# Patient Record
Sex: MALE | Race: WHITE | NOT HISPANIC OR LATINO | Employment: STUDENT | URBAN - METROPOLITAN AREA
[De-identification: names, ages, dates, MRNs, and addresses within clinical notes are randomized per-mention and may not be internally consistent; named-entity substitution may affect disease eponyms.]

---
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
## 2023-02-14 NOTE — PROGRESS NOTES
INTERNAL MEDICINE CLINIC - SAME DAY APPOINTMENT  Progress Note    PRESENTING HISTORY     PCP: Kizzy Whitehead MD    Chief Complaint/Reason for Visit:   No chief complaint on file.    History of Present Illness & ROS : Mr. Yosvany Craig is a 17 y.o. male.    Same day appt.   Very pleasant young man. Last seen by me in 9/2022, and has since est'd care with Dr. ANTWON Whitehead. He is scheduled to establish with Rheum, Dr MARTIN Perdue in 4/2023 2/2 + DOROTHEA. Presents today with a reported intermittent episodes of 'burning sensation to eyes, mild itching, burning to right ear and some pain if pressed, behind the right ear, all seems to happen when body is just very fatigued'. No cold symptoms endorsed, no sorethroat, cough, fever, chills, nasal congestion, body aches, headaches or GI symptoms.     Review of Systems:  Eyes: denies visual changes at this time denies floaters   Respiratory: no cough or shorness of breath  Cardiovascular: no chest pain or palpitations  Gastrointestinal: no nausea or vomiting, no abdominal pain or change in bowel habits  Genitourinary: no hematuria or dysuria; denies frequency  Hematologic/Lymphatic: no easy bruising or lymphadenopathy  Musculoskeletal: no arthralgias or myalgias  Neurological: no seizures or tremors  Endocrine: no heat or cold intolerance      PAST HISTORY:     No past medical history on file.    No past surgical history on file.    Family History   Problem Relation Age of Onset    Heart disease Father        Social History     Socioeconomic History    Marital status: Single   Tobacco Use    Smoking status: Never    Smokeless tobacco: Never       MEDICATIONS & ALLERGIES:     Current Outpatient Medications on File Prior to Visit   Medication Sig Dispense Refill    adapalene-benzoyl peroxide (EPIDUO FORTE) 0.3-2.5 % GlwP Apply topically every evening.      albuterol (PROVENTIL HFA) 90 mcg/actuation inhaler Inhale 2 puffs into the lungs every 6 (six) hours as needed for Wheezing. Rescue 18 g  2    albuterol (VENTOLIN HFA) 90 mcg/actuation inhaler Inhale 2 puffs into the lungs every 6 (six) hours as needed for Wheezing. Rescue 18 g 0    azithromycin (ZITHROMAX) 500 MG tablet Take 1 tablet (500 mg total) by mouth once daily. 5 tablet 0    fluocinonide (LIDEX) 0.05 % ointment Apply topically 2 (two) times daily as needed.      fluticasone propionate (FLOVENT HFA) 110 mcg/actuation inhaler Inhale 1 puff into the lungs 2 (two) times daily. Controller 12 g 0    predniSONE (DELTASONE) 20 MG tablet Take 2 tablets (40 mg total) by mouth once daily. 10 tablet 0     No current facility-administered medications on file prior to visit.        Review of patient's allergies indicates:  No Known Allergies    Medications Reconciliation:   I have reconciled the patient's home medications with the patient/family. I have updated all changes.  Refer to After-Visit Medication List.    OBJECTIVE:     Vital Signs:  There were no vitals filed for this visit.  Wt Readings from Last 3 Encounters:   12/08/22 1456 56.6 kg (124 lb 12.5 oz) (8 %, Z= -1.42)*   10/20/22 1331 56.3 kg (124 lb 1.9 oz) (8 %, Z= -1.44)*   09/29/22 1613 54.6 kg (120 lb 5.9 oz) (5 %, Z= -1.67)*     * Growth percentiles are based on CDC (Boys, 2-20 Years) data.     There is no height or weight on file to calculate BMI.   Wt Readings from Last 3 Encounters:   02/17/23 58.4 kg (128 lb 12 oz) (11 %, Z= -1.22)*   12/08/22 56.6 kg (124 lb 12.5 oz) (8 %, Z= -1.42)*   10/20/22 56.3 kg (124 lb 1.9 oz) (8 %, Z= -1.44)*     * Growth percentiles are based on CDC (Boys, 2-20 Years) data.     Temp Readings from Last 3 Encounters:   09/25/22 98.9 °F (37.2 °C) (Oral)     BP Readings from Last 3 Encounters:   02/17/23 112/74   12/08/22 120/78   10/20/22 120/83     Pulse Readings from Last 3 Encounters:   02/17/23 85   12/08/22 88   10/20/22 90       Physical Exam:  (Focused Exam)  General: Well developed, well nourished. No distress.  HEENT: Head is normocephalic,  atraumatic  OD: sclera spared; non injected; no visible crusting or matting of lashes  OS: unremarkable   Right TM: + moderate erythema and serous fluid  Left TM: unremarkable.   Unremarkable posterior oral pharynx; no erythema, exudate or purulence   Eyes: Clear conjunctiva.  Neck: Supple, symmetrical neck; trachea midline.  Extremities: No LE edema. Pulses 2+ and symmetric.   Skin: Skin color, texture, turgor normal. No rashes.  Musculoskeletal: Normal gait.   Neurologic: Normal strength and tone. No focal numbness or weakness.     Laboratory  Lab Results   Component Value Date    WBC 7.19 10/20/2022    HGB 15.6 10/20/2022    HCT 47.7 10/20/2022     10/20/2022    CHOL 154 10/20/2022    TRIG 80 10/20/2022    HDL 65 10/20/2022    ALT 9 (L) 10/20/2022    AST 19 10/20/2022     10/20/2022    K 4.0 10/20/2022     10/20/2022    CREATININE 0.9 10/20/2022    BUN 18 10/20/2022    CO2 27 10/20/2022    TSH 1.048 10/20/2022    HGBA1C 5.2 10/20/2022         ASSESSMENT & PLAN:     Right otitis media, unspecified otitis media type  (Based on clinical exam findings, recommend starting abx at this time and he will update his Primary care doctor, with response)  -     amoxicillin-clavulanate 500-125mg (AUGMENTIN) 500-125 mg Tab; Take 1 tablet (500 mg total) by mouth 2 (two) times daily.  Dispense: 14 tablet; Refill: 0    Burning sensation of eye  Other conjunctivitis of right eye  (Suspect may be 'allergy' driven; no clinical suggestion of 'bacerial' cause)  -     olopatadine (PATADAY) 0.2 % Drop; Place 1 drop into both eyes once daily.  Dispense: 2.5 mL; Refill: 0    +DOROTHEA:   May be potentially challenging his current symptoms. Pending evaluation with Rheumatology, Dr. Perdue in 4/2023.     Future Appointments   Date Time Provider Department Center   4/3/2023  3:00 PM Pablo Perdue MD [facility]        Medication List            Accurate as of February 17, 2023  2:15 PM. If you have any questions,  ask your nurse or doctor.                START taking these medications      amoxicillin-clavulanate 500-125mg 500-125 mg Tab  Commonly known as: AUGMENTIN  Take 1 tablet (500 mg total) by mouth 2 (two) times daily.  Started by: EMILY Harrington     olopatadine 0.2 % Drop  Commonly known as: PATADAY  Place 1 drop into both eyes once daily.  Started by: EMILY Harrington            CONTINUE taking these medications      adapalene-benzoyl peroxide 0.3-2.5 % Glwp  Commonly known as: EPIDUO FORTE     fluocinonide 0.05 % ointment  Commonly known as: LIDEX            STOP taking these medications      albuterol 90 mcg/actuation inhaler  Commonly known as: VENTOLIN HFA  Stopped by: EMILY Harrington     azithromycin 500 MG tablet  Commonly known as: ZITHROMAX  Stopped by: EMILY Harrington     fluticasone propionate 110 mcg/actuation inhaler  Commonly known as: FLOVENT HFA  Stopped by: EMILY Harrington     predniSONE 20 MG tablet  Commonly known as: DELTASONE  Stopped by: EMIYL Harrington               Where to Get Your Medications        These medications were sent to [facility] [address] [address]  [address] [address]      Phone: [phone]   amoxicillin-clavulanate 500-125mg 500-125 mg Tab  olopatadine 0.2 % Drop         Signing Physician:  EMILY Harrington

## 2023-02-17 ENCOUNTER — OFFICE VISIT (OUTPATIENT)
Dept: INTERNAL MEDICINE | Facility: CLINIC | Age: 18
End: 2023-02-17
Payer: COMMERCIAL

## 2023-02-17 VITALS
BODY MASS INDEX: 18.43 KG/M2 | WEIGHT: 128.75 LBS | HEIGHT: 70 IN | HEART RATE: 85 BPM | SYSTOLIC BLOOD PRESSURE: 112 MMHG | DIASTOLIC BLOOD PRESSURE: 74 MMHG | OXYGEN SATURATION: 100 %

## 2023-02-17 DIAGNOSIS — H57.89 BURNING SENSATION OF EYE: ICD-10-CM

## 2023-02-17 DIAGNOSIS — H66.91 RIGHT OTITIS MEDIA, UNSPECIFIED OTITIS MEDIA TYPE: Primary | ICD-10-CM

## 2023-02-17 DIAGNOSIS — H10.89 OTHER CONJUNCTIVITIS OF RIGHT EYE: ICD-10-CM

## 2023-02-17 PROCEDURE — 1160F RVW MEDS BY RX/DR IN RCRD: CPT | Mod: CPTII,S$GLB,, | Performed by: NURSE PRACTITIONER

## 2023-02-17 PROCEDURE — 99999 PR PBB SHADOW E&M-EST. PATIENT-LVL III: ICD-10-PCS | Mod: PBBFAC,,, | Performed by: NURSE PRACTITIONER

## 2023-02-17 PROCEDURE — 99214 PR OFFICE/OUTPT VISIT, EST, LEVL IV, 30-39 MIN: ICD-10-PCS | Mod: S$GLB,,, | Performed by: NURSE PRACTITIONER

## 2023-02-17 PROCEDURE — 99999 PR PBB SHADOW E&M-EST. PATIENT-LVL III: CPT | Mod: PBBFAC,,, | Performed by: NURSE PRACTITIONER

## 2023-02-17 PROCEDURE — 1159F PR MEDICATION LIST DOCUMENTED IN MEDICAL RECORD: ICD-10-PCS | Mod: CPTII,S$GLB,, | Performed by: NURSE PRACTITIONER

## 2023-02-17 PROCEDURE — 1160F PR REVIEW ALL MEDS BY PRESCRIBER/CLIN PHARMACIST DOCUMENTED: ICD-10-PCS | Mod: CPTII,S$GLB,, | Performed by: NURSE PRACTITIONER

## 2023-02-17 PROCEDURE — 3074F PR MOST RECENT SYSTOLIC BLOOD PRESSURE < 130 MM HG: ICD-10-PCS | Mod: CPTII,S$GLB,, | Performed by: NURSE PRACTITIONER

## 2023-02-17 PROCEDURE — 3008F PR BODY MASS INDEX (BMI) DOCUMENTED: ICD-10-PCS | Mod: CPTII,S$GLB,, | Performed by: NURSE PRACTITIONER

## 2023-02-17 PROCEDURE — 3078F PR MOST RECENT DIASTOLIC BLOOD PRESSURE < 80 MM HG: ICD-10-PCS | Mod: CPTII,S$GLB,, | Performed by: NURSE PRACTITIONER

## 2023-02-17 PROCEDURE — 3078F DIAST BP <80 MM HG: CPT | Mod: CPTII,S$GLB,, | Performed by: NURSE PRACTITIONER

## 2023-02-17 PROCEDURE — 3008F BODY MASS INDEX DOCD: CPT | Mod: CPTII,S$GLB,, | Performed by: NURSE PRACTITIONER

## 2023-02-17 PROCEDURE — 1159F MED LIST DOCD IN RCRD: CPT | Mod: CPTII,S$GLB,, | Performed by: NURSE PRACTITIONER

## 2023-02-17 PROCEDURE — 3074F SYST BP LT 130 MM HG: CPT | Mod: CPTII,S$GLB,, | Performed by: NURSE PRACTITIONER

## 2023-02-17 PROCEDURE — 99214 OFFICE O/P EST MOD 30 MIN: CPT | Mod: S$GLB,,, | Performed by: NURSE PRACTITIONER

## 2023-02-17 RX ORDER — AMOXICILLIN AND CLAVULANATE POTASSIUM 500; 125 MG/1; MG/1
1 TABLET, FILM COATED ORAL 2 TIMES DAILY
Qty: 14 TABLET | Refills: 0 | Status: SHIPPED | OUTPATIENT
Start: 2023-02-17

## 2023-02-17 RX ORDER — OLOPATADINE HYDROCHLORIDE 2 MG/ML
1 SOLUTION/ DROPS OPHTHALMIC DAILY
Qty: 2.5 ML | Refills: 0 | Status: SHIPPED | OUTPATIENT
Start: 2023-02-17 | End: 2024-02-17

## 2023-03-02 ENCOUNTER — OFFICE VISIT (OUTPATIENT)
Dept: INTERNAL MEDICINE | Facility: CLINIC | Age: 18
End: 2023-03-02
Payer: COMMERCIAL

## 2023-03-02 VITALS
HEIGHT: 70 IN | DIASTOLIC BLOOD PRESSURE: 70 MMHG | BODY MASS INDEX: 17.9 KG/M2 | OXYGEN SATURATION: 100 % | HEART RATE: 85 BPM | TEMPERATURE: 99 F | SYSTOLIC BLOOD PRESSURE: 112 MMHG | WEIGHT: 125 LBS

## 2023-03-02 DIAGNOSIS — H57.89 EYE IRRITATION: ICD-10-CM

## 2023-03-02 PROCEDURE — 3078F PR MOST RECENT DIASTOLIC BLOOD PRESSURE < 80 MM HG: ICD-10-PCS | Mod: CPTII,S$GLB,, | Performed by: STUDENT IN AN ORGANIZED HEALTH CARE EDUCATION/TRAINING PROGRAM

## 2023-03-02 PROCEDURE — 1159F MED LIST DOCD IN RCRD: CPT | Mod: CPTII,S$GLB,, | Performed by: STUDENT IN AN ORGANIZED HEALTH CARE EDUCATION/TRAINING PROGRAM

## 2023-03-02 PROCEDURE — 3074F PR MOST RECENT SYSTOLIC BLOOD PRESSURE < 130 MM HG: ICD-10-PCS | Mod: CPTII,S$GLB,, | Performed by: STUDENT IN AN ORGANIZED HEALTH CARE EDUCATION/TRAINING PROGRAM

## 2023-03-02 PROCEDURE — 3074F SYST BP LT 130 MM HG: CPT | Mod: CPTII,S$GLB,, | Performed by: STUDENT IN AN ORGANIZED HEALTH CARE EDUCATION/TRAINING PROGRAM

## 2023-03-02 PROCEDURE — 3008F PR BODY MASS INDEX (BMI) DOCUMENTED: ICD-10-PCS | Mod: CPTII,S$GLB,, | Performed by: STUDENT IN AN ORGANIZED HEALTH CARE EDUCATION/TRAINING PROGRAM

## 2023-03-02 PROCEDURE — 99999 PR PBB SHADOW E&M-EST. PATIENT-LVL III: ICD-10-PCS | Mod: PBBFAC,,, | Performed by: STUDENT IN AN ORGANIZED HEALTH CARE EDUCATION/TRAINING PROGRAM

## 2023-03-02 PROCEDURE — 1160F RVW MEDS BY RX/DR IN RCRD: CPT | Mod: CPTII,S$GLB,, | Performed by: STUDENT IN AN ORGANIZED HEALTH CARE EDUCATION/TRAINING PROGRAM

## 2023-03-02 PROCEDURE — 1160F PR REVIEW ALL MEDS BY PRESCRIBER/CLIN PHARMACIST DOCUMENTED: ICD-10-PCS | Mod: CPTII,S$GLB,, | Performed by: STUDENT IN AN ORGANIZED HEALTH CARE EDUCATION/TRAINING PROGRAM

## 2023-03-02 PROCEDURE — 99999 PR PBB SHADOW E&M-EST. PATIENT-LVL III: CPT | Mod: PBBFAC,,, | Performed by: STUDENT IN AN ORGANIZED HEALTH CARE EDUCATION/TRAINING PROGRAM

## 2023-03-02 PROCEDURE — 99213 PR OFFICE/OUTPT VISIT, EST, LEVL III, 20-29 MIN: ICD-10-PCS | Mod: S$GLB,,, | Performed by: STUDENT IN AN ORGANIZED HEALTH CARE EDUCATION/TRAINING PROGRAM

## 2023-03-02 PROCEDURE — 3008F BODY MASS INDEX DOCD: CPT | Mod: CPTII,S$GLB,, | Performed by: STUDENT IN AN ORGANIZED HEALTH CARE EDUCATION/TRAINING PROGRAM

## 2023-03-02 PROCEDURE — 99213 OFFICE O/P EST LOW 20 MIN: CPT | Mod: S$GLB,,, | Performed by: STUDENT IN AN ORGANIZED HEALTH CARE EDUCATION/TRAINING PROGRAM

## 2023-03-02 PROCEDURE — 3078F DIAST BP <80 MM HG: CPT | Mod: CPTII,S$GLB,, | Performed by: STUDENT IN AN ORGANIZED HEALTH CARE EDUCATION/TRAINING PROGRAM

## 2023-03-02 PROCEDURE — 1159F PR MEDICATION LIST DOCUMENTED IN MEDICAL RECORD: ICD-10-PCS | Mod: CPTII,S$GLB,, | Performed by: STUDENT IN AN ORGANIZED HEALTH CARE EDUCATION/TRAINING PROGRAM

## 2023-03-02 RX ORDER — LORATADINE 10 MG/1
10 TABLET ORAL DAILY
Qty: 30 TABLET | Refills: 0 | Status: SHIPPED | OUTPATIENT
Start: 2023-03-02 | End: 2024-03-01

## 2023-03-02 NOTE — PROGRESS NOTES
Subjective:       Patient ID: Yosvany Craig is a 17 y.o. male.    Chief Complaint: Eye Burn (Both eyes burning ) and ear burning (Both ears been very hot and jaws as well)    HPI  Patient is a 18 yo male w/ Hx of positive DOROTHEA with rheum appt scheduled for April is here today for burning sensation in his eyes. Does have clear discharge from the eye. This never happened in the past. He has sensation of heat  behind his ears and around jaw area which has been happening for 3 yrs. Also had some dizziness today. Burning sensation in his eyes going on for couple of weeks now. Been taking oral antibiotic for an ear infection for the last week. Denies new pets, soaps, detergents, body wash, soap, facewash. Takes acne medicine for over a year.   -has no vision issues, no blurry vision. Did not try oral antihistamine. Did not get his eyes checked recently  -dizziness today. More like disorientation, wooziness. Has been hydrating. BP and HR are wnl. Happened during class.   Review of Systems   Constitutional:  Negative for chills and fever.   HENT:  Negative for nasal congestion, postnasal drip and rhinorrhea.    Eyes:  Positive for pain and eye dryness. Negative for visual disturbance.   Gastrointestinal:  Negative for constipation, diarrhea, nausea and vomiting.   Neurological:  Positive for light-headedness. Negative for dizziness and headaches.       Objective:      Physical Exam  Constitutional:       Appearance: Normal appearance.   Eyes:      Conjunctiva/sclera: Conjunctivae normal.      Pupils: Pupils are equal, round, and reactive to light.   Cardiovascular:      Rate and Rhythm: Normal rate and regular rhythm.      Heart sounds: Normal heart sounds.   Pulmonary:      Effort: Pulmonary effort is normal. No respiratory distress.      Breath sounds: Normal breath sounds. No wheezing.   Musculoskeletal:      Right lower leg: No edema.      Left lower leg: No edema.   Skin:     General: Skin is warm.   Neurological:       Mental Status: He is alert and oriented to person, place, and time.   Psychiatric:         Mood and Affect: Mood normal.         Behavior: Behavior normal.               Assessment and Plan:       1. Eye irritation  Assessment & Plan:  Asked him to see an optometrist to get his vision checked just to make sure that is okay. Use the eye drops NP becca gave him. Take the loratadine daily. Stay hydrated. Keep tabs of where you feel the burning is worse and if and when the eyes turn red.       Other orders  -     loratadine (CLARITIN) 10 mg tablet; Take 1 tablet (10 mg total) by mouth once daily.  Dispense: 30 tablet; Refill: 0

## 2023-03-02 NOTE — ASSESSMENT & PLAN NOTE
Asked him to see an optometrist to get his vision checked just to make sure that is okay. Use the eye drops BETO hudson gave him. Take the loratadine daily. Stay hydrated. Keep tabs of where you feel the burning is worse and if and when the eyes turn red.

## 2023-03-06 ENCOUNTER — TELEPHONE (OUTPATIENT)
Dept: RHEUMATOLOGY | Facility: CLINIC | Age: 18
End: 2023-03-06
Payer: COMMERCIAL

## 2023-05-01 ENCOUNTER — LAB VISIT (OUTPATIENT)
Dept: LAB | Facility: HOSPITAL | Age: 18
End: 2023-05-01
Attending: INTERNAL MEDICINE
Payer: COMMERCIAL

## 2023-05-01 ENCOUNTER — OFFICE VISIT (OUTPATIENT)
Dept: RHEUMATOLOGY | Facility: CLINIC | Age: 18
End: 2023-05-01
Payer: COMMERCIAL

## 2023-05-01 VITALS
SYSTOLIC BLOOD PRESSURE: 118 MMHG | DIASTOLIC BLOOD PRESSURE: 72 MMHG | WEIGHT: 127.5 LBS | OXYGEN SATURATION: 98 % | BODY MASS INDEX: 18.25 KG/M2 | HEIGHT: 70 IN | HEART RATE: 82 BPM

## 2023-05-01 DIAGNOSIS — Z71.89 COUNSELING AND COORDINATION OF CARE: ICD-10-CM

## 2023-05-01 DIAGNOSIS — R76.8 POSITIVE ANA (ANTINUCLEAR ANTIBODY): ICD-10-CM

## 2023-05-01 DIAGNOSIS — R76.8 POSITIVE ANA (ANTINUCLEAR ANTIBODY): Primary | ICD-10-CM

## 2023-05-01 PROCEDURE — 99999 PR PBB SHADOW E&M-EST. PATIENT-LVL IV: ICD-10-PCS | Mod: PBBFAC,,, | Performed by: INTERNAL MEDICINE

## 2023-05-01 PROCEDURE — 99204 PR OFFICE/OUTPT VISIT, NEW, LEVL IV, 45-59 MIN: ICD-10-PCS | Mod: ,,, | Performed by: INTERNAL MEDICINE

## 2023-05-01 PROCEDURE — 99204 OFFICE O/P NEW MOD 45 MIN: CPT | Mod: ,,, | Performed by: INTERNAL MEDICINE

## 2023-05-01 PROCEDURE — 99999 PR PBB SHADOW E&M-EST. PATIENT-LVL IV: CPT | Mod: PBBFAC,,, | Performed by: INTERNAL MEDICINE

## 2023-05-01 RX ORDER — METHYLPREDNISOLONE 4 MG/1
TABLET ORAL
Qty: 1 EACH | Refills: 0 | Status: SHIPPED | OUTPATIENT
Start: 2023-05-01

## 2023-05-01 NOTE — PROGRESS NOTES
RHEUMATOLOGY OUTPATIENT CLINIC NOTE    5/1/2023    Attending Rheumatologist: Pablo Perdue  Primary Care Provider: Kizzy Whitehead MD   Physician Requesting Consultation: Kizzy Whitehead MD  [address] [place] [address]  Chief Complaint/Reason For Consultation:  abnormal blood test      Subjective:       HPI  Yosvany Craig is a 17 y.o. White male with medical history noted below who presents for evaluation of +DOROTHEA.     Patient presents for evaluation of +DOROTHEA. He notes around 2019 going to see ENT for behind ear pain, fatigue. Had DOROTHEA checked then (+), did not see Rheum. He notes now similar presentation, pain behind the ear, warmth, redness, fatigue, brain fog. These are episodic, though he can note it occurring a couple times a week. Notes an episode in March that was associated with dry, irritated eyes, dizzy. Denies fever, chills, sore throat, runny nose. Has tried ABX, allergie meds, Medrol with little relief, notes a cold rag over the head helps. No hearing loss, +Ringing. FHX of RA. +Tremors, GERD.  No Alopecia, Oral/Nasal Ulcers, Rash, Photosensitivity, Dry Mouth, Pleuritis/serositis, Arthritis, Easy Bruising, LAD, Raynaud's, Blood clots, Skin tightening, SOB, Chest pain, wt loss, diarrhea or constipation, urinary issues.     Review of Systems   Constitutional:  Positive for fatigue. Negative for chills, fever and unexpected weight change.   HENT:  Positive for ear pain. Negative for mouth sores.    Eyes:  Negative for redness and eye dryness.   Respiratory:  Negative for cough and shortness of breath.    Cardiovascular:  Negative for chest pain.   Gastrointestinal:  Negative for abdominal distention, constipation, diarrhea, nausea, vomiting and reflux.   Musculoskeletal:  Negative for arthralgias, back pain, gait problem, joint swelling, leg pain, myalgias, neck pain, neck stiffness and joint deformity.   Integumentary:  Negative for rash.   Neurological:  Negative for  weakness, numbness, headaches and memory loss.   Hematological:  Negative for adenopathy. Does not bruise/bleed easily.   Psychiatric/Behavioral:  Negative for confusion, decreased concentration, sleep disturbance and suicidal ideas. The patient is not nervous/anxious.    All other systems reviewed and are negative.     Chronic comorbid conditions affecting medical decision making today:  History reviewed. No pertinent past medical history.  History reviewed. No pertinent surgical history.  Family History   Problem Relation Age of Onset    Heart disease Father      Social History     Substance and Sexual Activity   Alcohol Use None     Social History     Tobacco Use   Smoking Status Never   Smokeless Tobacco Never     Social History     Substance and Sexual Activity   Drug Use Not on file       Current Outpatient Medications:     fluocinonide (LIDEX) 0.05 % ointment, Apply topically 2 (two) times daily as needed., Disp: , Rfl:     adapalene-benzoyl peroxide (EPIDUO FORTE) 0.3-2.5 % GlwP, Apply topically every evening., Disp: , Rfl:     amoxicillin-clavulanate 500-125mg (AUGMENTIN) 500-125 mg Tab, Take 1 tablet (500 mg total) by mouth 2 (two) times daily. (Patient not taking: Reported on 5/1/2023), Disp: 14 tablet, Rfl: 0    loratadine (CLARITIN) 10 mg tablet, Take 1 tablet (10 mg total) by mouth once daily. (Patient not taking: Reported on 5/1/2023), Disp: 30 tablet, Rfl: 0    methylPREDNISolone (MEDROL DOSEPACK) 4 mg tablet, use as directed, Disp: 1 each, Rfl: 0    olopatadine (PATADAY) 0.2 % Drop, Place 1 drop into both eyes once daily. (Patient not taking: Reported on 5/1/2023), Disp: 2.5 mL, Rfl: 0     Objective:         Vitals:    05/01/23 1455   BP: 118/72   Pulse: 82     Physical Exam  Can make fist, no synovitis  Wrists, Elbows, Shoulder ROM ok  Hip, Knee Ok  Negative ankle/MTP  No tender points  Ears: +tympanic membrane, wax   TTP behind ears   Reviewed old and all outside pertinent medical records  available.    All lab results personally reviewed and interpreted by me.  Lab Results   Component Value Date    WBC 7.19 10/20/2022    HGB 15.6 10/20/2022    HCT 47.7 10/20/2022    MCV 88 10/20/2022    MCH 28.8 10/20/2022    MCHC 32.7 10/20/2022    RDW 13.3 10/20/2022     10/20/2022    MPV 10.5 10/20/2022       Lab Results   Component Value Date     10/20/2022    K 4.0 10/20/2022     10/20/2022    CO2 27 10/20/2022    GLU 85 10/20/2022    BUN 18 10/20/2022    CALCIUM 10.0 10/20/2022    PROT 8.1 10/20/2022    ALBUMIN 4.6 10/20/2022    BILITOT 0.7 10/20/2022    AST 19 10/20/2022    ALKPHOS 86 10/20/2022    ALT 9 (L) 10/20/2022       No results found for: COLORU, APPEARANCEUA, SPECGRAV, PHUR, PHUA, PROTEINUA, GLUCOSEU, KETONESU, BLOODU, LEUKOCYTESUR, NITRITE, UROBILINOGEN    No results found for: CRP    No results found for: SEDRATE, ERYTHROCYTES    Lab Results   Component Value Date    DOROTHEA Positive (A) 10/20/2022       No components found for: 25OHVITDTOT, 15VQPNVP7, 74FRTDOI9, METHODNOTE    No results found for: URICACID    No components found for: TSPOTTB        Imaging:  All imaging reviewed and independently interpreted by me.         ASSESSMENT / PLAN:     Yosvany Craig is a 17 y.o. White male with:      1. Positive DOROTHEA (antinuclear antibody)  - DOROTHEA 1:320, Homogenous/Speckled, Profile Negative  - discussed results  - the etiology of his symptoms are unclear, I have advised him to keep a log and track his pain, triggers, timing   - give Medrol pack so next time he flare can see response, SE discussed  - labs as below  - reassurance   - Ambulatory referral/consult to Rheumatology  - C-Reactive Protein; Future  - DOROTHEA Screen w/Reflex; Future  - Rheumatoid Factor; Future  - CBC Auto Differential; Future  - Comprehensive Metabolic Panel; Future  - Cyclic Citrullinated Peptide Antibody, IgG; Future  - Protein/Creatinine Ratio, Urine; Future  - C4 Complement; Future  - C3 Complement; Future  -  Anti-Histone Antibody; Future  - Sedimentation rate; Future  - Urinalysis; Future  - Hepatitis B Surface Ab, Qualitative; Future  - Vitamin D; Future  - TSH; Future  - Hepatitis C Antibody; Future  - Hepatitis B Surface Antigen; Future  - CK; Future  - Quantiferon Gold TB; Future  - HIV 1/2 Ag/Ab (4th Gen); Future  - IgG 1, 2, 3, and 4; Future  - Immunoglobulins (IgG, IgA, IgM) Quantitative; Future  - HLA B27 Antigen; Future  - Interleukin-6; Future  - THYROID PEROXIDASE ANTIBODY; Future  - THYROID STIMULATING IMMUNOGLOBULIN; Future  - THYROTROPIN RECEPTOR ANTIBODY; Future  - Anti-Neutrophilic Cytoplasmic Antibody; Future  - Cryoglobulin; Future  - Proteinase 3 Autoantibodies; Future  - Myeloperoxidase Antibody (MPO); Future    2. Other specified counseling  - over 10 minutes spent regarding below topics:  - Immunization counseling done.  - Weight loss counseling done.  - Nutrition and exercise counseling.  - Limitation of alcohol consumption.  - Regular exercise:  Aerobic and resistance.  - Medication counseling provided.      Follow up in about 7 weeks (around 6/21/2023).    Method of contact with patient concerns: [facility]    Disclaimer:  This note is prepared using voice recognition software and as such is likely to have errors and has not been proof read. Please contact me for questions.     Time spent: 45 minutes in face to face discussion concerning diagnosis, prognosis, review of lab and test results, benefits of treatment as well as management of disease, counseling of patient and coordination of care between various health care providers.  Greater than half the time spent was used for coordination of care and counseling of patient.    Pablo Perdue M.D.  Rheumatology Department   [facility]

## 2023-05-08 ENCOUNTER — LAB VISIT (OUTPATIENT)
Dept: LAB | Facility: HOSPITAL | Age: 18
End: 2023-05-08
Attending: INTERNAL MEDICINE
Payer: COMMERCIAL

## 2023-05-08 ENCOUNTER — PATIENT MESSAGE (OUTPATIENT)
Dept: RHEUMATOLOGY | Facility: CLINIC | Age: 18
End: 2023-05-08
Payer: COMMERCIAL

## 2023-05-08 DIAGNOSIS — R76.8 POSITIVE ANA (ANTINUCLEAR ANTIBODY): ICD-10-CM

## 2023-05-08 LAB
25(OH)D3+25(OH)D2 SERPL-MCNC: 31 NG/ML (ref 30–96)
ALBUMIN SERPL BCP-MCNC: 4.8 G/DL (ref 3.5–5.2)
ALP SERPL-CCNC: 89 U/L (ref 55–135)
ALT SERPL W/O P-5'-P-CCNC: 10 U/L (ref 10–44)
ANION GAP SERPL CALC-SCNC: 10 MMOL/L (ref 8–16)
AST SERPL-CCNC: 15 U/L (ref 10–40)
BASOPHILS # BLD AUTO: 0.05 K/UL (ref 0–0.2)
BASOPHILS NFR BLD: 0.7 % (ref 0–1.9)
BILIRUB SERPL-MCNC: 0.5 MG/DL (ref 0.1–1)
BUN SERPL-MCNC: 16 MG/DL (ref 6–20)
C3 SERPL-MCNC: 118 MG/DL (ref 50–180)
C4 SERPL-MCNC: 21 MG/DL (ref 11–44)
CALCIUM SERPL-MCNC: 10.1 MG/DL (ref 8.7–10.5)
CCP AB SER IA-ACNC: 0.8 U/ML
CHLORIDE SERPL-SCNC: 105 MMOL/L (ref 95–110)
CK SERPL-CCNC: 89 U/L (ref 20–200)
CO2 SERPL-SCNC: 26 MMOL/L (ref 23–29)
CREAT SERPL-MCNC: 0.8 MG/DL (ref 0.5–1.4)
CRP SERPL-MCNC: 0.8 MG/L (ref 0–8.2)
DIFFERENTIAL METHOD: ABNORMAL
EOSINOPHIL # BLD AUTO: 0.2 K/UL (ref 0–0.5)
EOSINOPHIL NFR BLD: 2.4 % (ref 0–8)
ERYTHROCYTE [DISTWIDTH] IN BLOOD BY AUTOMATED COUNT: 13 % (ref 11.5–14.5)
ERYTHROCYTE [SEDIMENTATION RATE] IN BLOOD BY PHOTOMETRIC METHOD: 3 MM/HR (ref 0–23)
EST. GFR  (NO RACE VARIABLE): >60 ML/MIN/1.73 M^2
GLUCOSE SERPL-MCNC: 53 MG/DL (ref 70–110)
HBV SURFACE AB SER-ACNC: <3 MIU/ML
HBV SURFACE AB SER-ACNC: NORMAL M[IU]/ML
HBV SURFACE AG SERPL QL IA: NORMAL
HCT VFR BLD AUTO: 47.4 % (ref 40–54)
HCV AB SERPL QL IA: ABNORMAL
HGB BLD-MCNC: 15.1 G/DL (ref 14–18)
HIV 1+2 AB+HIV1 P24 AG SERPL QL IA: NORMAL
IGA SERPL-MCNC: 134 MG/DL (ref 40–350)
IGG SERPL-MCNC: 1225 MG/DL (ref 650–1600)
IGM SERPL-MCNC: 93 MG/DL (ref 50–300)
IMM GRANULOCYTES # BLD AUTO: 0.02 K/UL (ref 0–0.04)
IMM GRANULOCYTES NFR BLD AUTO: 0.3 % (ref 0–0.5)
LYMPHOCYTES # BLD AUTO: 1.7 K/UL (ref 1–4.8)
LYMPHOCYTES NFR BLD: 24.4 % (ref 18–48)
MCH RBC QN AUTO: 28.3 PG (ref 27–31)
MCHC RBC AUTO-ENTMCNC: 31.9 G/DL (ref 32–36)
MCV RBC AUTO: 89 FL (ref 82–98)
MONOCYTES # BLD AUTO: 0.6 K/UL (ref 0.3–1)
MONOCYTES NFR BLD: 9 % (ref 4–15)
NEUTROPHILS # BLD AUTO: 4.4 K/UL (ref 1.8–7.7)
NEUTROPHILS NFR BLD: 63.2 % (ref 38–73)
NRBC BLD-RTO: 0 /100 WBC
PLATELET # BLD AUTO: 295 K/UL (ref 150–450)
PMV BLD AUTO: 10.5 FL (ref 9.2–12.9)
POTASSIUM SERPL-SCNC: 3.9 MMOL/L (ref 3.5–5.1)
PROT SERPL-MCNC: 8 G/DL (ref 6–8.4)
RBC # BLD AUTO: 5.33 M/UL (ref 4.6–6.2)
RHEUMATOID FACT SERPL-ACNC: <13 IU/ML (ref 0–15)
SODIUM SERPL-SCNC: 141 MMOL/L (ref 136–145)
THYROPEROXIDASE IGG SERPL-ACNC: <6 IU/ML
TSH SERPL DL<=0.005 MIU/L-ACNC: 1.65 UIU/ML (ref 0.4–4)
WBC # BLD AUTO: 6.98 K/UL (ref 3.9–12.7)

## 2023-05-08 PROCEDURE — 83516 IMMUNOASSAY NONANTIBODY: CPT | Performed by: INTERNAL MEDICINE

## 2023-05-08 PROCEDURE — 86160 COMPLEMENT ANTIGEN: CPT | Mod: 59 | Performed by: INTERNAL MEDICINE

## 2023-05-08 PROCEDURE — 84445 ASSAY OF TSI GLOBULIN: CPT | Performed by: INTERNAL MEDICINE

## 2023-05-08 PROCEDURE — 82550 ASSAY OF CK (CPK): CPT | Performed by: INTERNAL MEDICINE

## 2023-05-08 PROCEDURE — 87340 HEPATITIS B SURFACE AG IA: CPT | Performed by: INTERNAL MEDICINE

## 2023-05-08 PROCEDURE — 83516 IMMUNOASSAY NONANTIBODY: CPT | Mod: 59 | Performed by: INTERNAL MEDICINE

## 2023-05-08 PROCEDURE — 80053 COMPREHEN METABOLIC PANEL: CPT | Performed by: INTERNAL MEDICINE

## 2023-05-08 PROCEDURE — 81374 HLA I TYPING 1 ANTIGEN LR: CPT | Mod: PO | Performed by: INTERNAL MEDICINE

## 2023-05-08 PROCEDURE — 82784 ASSAY IGA/IGD/IGG/IGM EACH: CPT | Mod: 59 | Performed by: INTERNAL MEDICINE

## 2023-05-08 PROCEDURE — 82306 VITAMIN D 25 HYDROXY: CPT | Performed by: INTERNAL MEDICINE

## 2023-05-08 PROCEDURE — 87389 HIV-1 AG W/HIV-1&-2 AB AG IA: CPT | Performed by: INTERNAL MEDICINE

## 2023-05-08 PROCEDURE — 86200 CCP ANTIBODY: CPT | Performed by: INTERNAL MEDICINE

## 2023-05-08 PROCEDURE — 86480 TB TEST CELL IMMUN MEASURE: CPT | Performed by: INTERNAL MEDICINE

## 2023-05-08 PROCEDURE — 85652 RBC SED RATE AUTOMATED: CPT | Performed by: INTERNAL MEDICINE

## 2023-05-08 PROCEDURE — 86140 C-REACTIVE PROTEIN: CPT | Performed by: INTERNAL MEDICINE

## 2023-05-08 PROCEDURE — 84443 ASSAY THYROID STIM HORMONE: CPT | Performed by: INTERNAL MEDICINE

## 2023-05-08 PROCEDURE — 86160 COMPLEMENT ANTIGEN: CPT | Performed by: INTERNAL MEDICINE

## 2023-05-08 PROCEDURE — 86706 HEP B SURFACE ANTIBODY: CPT | Performed by: INTERNAL MEDICINE

## 2023-05-08 PROCEDURE — 86803 HEPATITIS C AB TEST: CPT | Performed by: INTERNAL MEDICINE

## 2023-05-08 PROCEDURE — 86376 MICROSOMAL ANTIBODY EACH: CPT | Performed by: INTERNAL MEDICINE

## 2023-05-08 PROCEDURE — 85025 COMPLETE CBC W/AUTO DIFF WBC: CPT | Performed by: INTERNAL MEDICINE

## 2023-05-08 PROCEDURE — 86431 RHEUMATOID FACTOR QUANT: CPT | Performed by: INTERNAL MEDICINE

## 2023-05-08 PROCEDURE — 82787 IGG 1 2 3 OR 4 EACH: CPT | Mod: 59 | Performed by: INTERNAL MEDICINE

## 2023-05-08 PROCEDURE — 83520 IMMUNOASSAY QUANT NOS NONAB: CPT | Performed by: INTERNAL MEDICINE

## 2023-05-08 PROCEDURE — 86036 ANCA SCREEN EACH ANTIBODY: CPT | Mod: 59 | Performed by: INTERNAL MEDICINE

## 2023-05-10 LAB
GAMMA INTERFERON BACKGROUND BLD IA-ACNC: 0 IU/ML
M TB IFN-G CD4+ BCKGRND COR BLD-ACNC: 0.07 IU/ML
MITOGEN IGNF BCKGRD COR BLD-ACNC: 9.2 IU/ML
PROTEINASE3 IGG SER-ACNC: <0.2 U
TB GOLD PLUS: NEGATIVE
TB2 - NIL: 0.04 IU/ML
TSH RECEP AB SER-ACNC: <1.1 IU/L (ref 0–1.75)

## 2023-05-11 LAB
ANCA AB TITR SER IF: NORMAL TITER
HISTONE IGG SER IA-ACNC: 0.5 UNITS (ref 0–0.9)
IGG1 SER-MCNC: 673 MG/DL (ref 382–929)
IGG2 SER-MCNC: 318 MG/DL (ref 242–700)
IGG3 SER-MCNC: 68 MG/DL (ref 22–176)
IGG4 SER-MCNC: 81 MG/DL (ref 4–86)
MYELOPEROXIDASE AB SER-ACNC: 0.2 U/ML
P-ANCA TITR SER IF: NORMAL TITER
TSI SER-ACNC: <0.1 IU/L

## 2023-05-19 LAB
HLA B27 INTERPRETATION: NORMAL
HLA-B27 RELATED AG QL: NEGATIVE
HLA-B27 RELATED AG QL: NORMAL

## 2023-06-21 ENCOUNTER — OFFICE VISIT (OUTPATIENT)
Dept: RHEUMATOLOGY | Facility: CLINIC | Age: 18
End: 2023-06-21
Payer: COMMERCIAL

## 2023-06-21 DIAGNOSIS — R76.8 POSITIVE ANA (ANTINUCLEAR ANTIBODY): Primary | ICD-10-CM

## 2023-06-21 DIAGNOSIS — Z71.89 COUNSELING AND COORDINATION OF CARE: ICD-10-CM

## 2023-06-21 PROCEDURE — 99214 OFFICE O/P EST MOD 30 MIN: CPT | Mod: 95,,, | Performed by: INTERNAL MEDICINE

## 2023-06-21 PROCEDURE — 99214 PR OFFICE/OUTPT VISIT, EST, LEVL IV, 30-39 MIN: ICD-10-PCS | Mod: 95,,, | Performed by: INTERNAL MEDICINE

## 2023-06-21 NOTE — PROGRESS NOTES
The patient location is: Home  The chief complaint leading to consultation is: Follow up +DOROTHEA  Visit type: Virtual visit with synchronous audio and video  Total time spent with patient: 15 minutes     Each patient to whom he or she provides medical services by telemedicine is:  (1) informed of the relationship between the physician and patient and the respective role of any other health care provider with respect to management of the patient; and (2) notified that he or she may decline to receive medical services by telemedicine and may withdraw from such care at any time.           RHEUMATOLOGY OUTPATIENT CLINIC NOTE    6/21/2023    Attending Rheumatologist: Pablo Perdue  Primary Care Provider: Kizzy Whitehead MD   Physician Requesting Consultation: No referring provider defined for this encounter.  Chief Complaint/Reason For Consultation:  No chief complaint on file.      Subjective:       BOB Craig is a 17 y.o. White male with medical history noted below who presents for evaluation of +DOROTHEA.   Patient presents for evaluation of +DOROTHEA. He notes around 2019 going to see ENT for behind ear pain, fatigue. Had DOROTHEA checked then (+), did not see Rheum. He notes now similar presentation, pain behind the ear, warmth, redness, fatigue, brain fog. These are episodic, though he can note it occurring a couple times a week. Notes an episode in March that was associated with dry, irritated eyes, dizzy. Denies fever, chills, sore throat, runny nose. Has tried ABX, allergie meds, Medrol with little relief, notes a cold rag over the head helps. No hearing loss, +Ringing. FHX of RA. +Tremors, GERD.  No Alopecia, Oral/Nasal Ulcers, Rash, Photosensitivity, Dry Mouth, Pleuritis/serositis, Arthritis, Easy Bruising, LAD, Raynaud's, Blood clots, Skin tightening, SOB, Chest pain, wt loss, diarrhea or constipation, urinary issues.     Today  Patient here for follow up.   Last visit presented for evaluation of +DOROTHEA and ear  pain. He notes an episodes of ear pain/pressure, dizziness a couple years back, now get pressure behind the ears at times. Notes epistaxis at times. Has not had to use Medrol pack. Notes since being home from college symptoms have improved. ?Environmental factors.      Review of Systems   Constitutional:  Positive for fatigue. Negative for chills, fever and unexpected weight change.   HENT:  Positive for ear pain. Negative for mouth sores and trouble swallowing.    Eyes:  Negative for redness and eye dryness.   Respiratory:  Negative for cough and shortness of breath.    Cardiovascular:  Negative for chest pain.   Gastrointestinal:  Negative for abdominal distention, constipation, diarrhea, nausea, vomiting and reflux.   Genitourinary:  Negative for genital sores.   Musculoskeletal:  Negative for arthralgias, back pain, gait problem, joint swelling, leg pain, myalgias, neck pain, neck stiffness and joint deformity.   Integumentary:  Negative for rash.   Neurological:  Negative for weakness, numbness, headaches and memory loss.   Hematological:  Negative for adenopathy. Does not bruise/bleed easily.   Psychiatric/Behavioral:  Negative for confusion, decreased concentration, sleep disturbance and suicidal ideas. The patient is not nervous/anxious.    All other systems reviewed and are negative.     Chronic comorbid conditions affecting medical decision making today:  No past medical history on file.  No past surgical history on file.  Family History   Problem Relation Age of Onset    Heart disease Father      Social History     Substance and Sexual Activity   Alcohol Use None     Social History     Tobacco Use   Smoking Status Never   Smokeless Tobacco Never     Social History     Substance and Sexual Activity   Drug Use Not on file       Current Outpatient Medications:     adapalene-benzoyl peroxide (EPIDUO FORTE) 0.3-2.5 % GlwP, Apply topically every evening., Disp: , Rfl:     amoxicillin-clavulanate 500-125mg  (AUGMENTIN) 500-125 mg Tab, Take 1 tablet (500 mg total) by mouth 2 (two) times daily. (Patient not taking: Reported on 5/1/2023), Disp: 14 tablet, Rfl: 0    fluocinonide (LIDEX) 0.05 % ointment, Apply topically 2 (two) times daily as needed., Disp: , Rfl:     loratadine (CLARITIN) 10 mg tablet, Take 1 tablet (10 mg total) by mouth once daily. (Patient not taking: Reported on 5/1/2023), Disp: 30 tablet, Rfl: 0    methylPREDNISolone (MEDROL DOSEPACK) 4 mg tablet, use as directed, Disp: 1 each, Rfl: 0    olopatadine (PATADAY) 0.2 % Drop, Place 1 drop into both eyes once daily. (Patient not taking: Reported on 5/1/2023), Disp: 2.5 mL, Rfl: 0     Objective:         There were no vitals filed for this visit.    Physical Exam  Can make fist, no synovitis  Wrists, Elbows, Shoulder ROM ok  Hip, Knee Ok  Negative ankle/MTP  No tender points  Ears: +tympanic membrane, wax   TTP behind ears     6/21/23: Virtual Visit    Reviewed old and all outside pertinent medical records available.    All lab results personally reviewed and interpreted by me.  Lab Results   Component Value Date    WBC 6.98 05/08/2023    HGB 15.1 05/08/2023    HCT 47.4 05/08/2023    MCV 89 05/08/2023    MCH 28.3 05/08/2023    MCHC 31.9 (L) 05/08/2023    RDW 13.0 05/08/2023     05/08/2023    MPV 10.5 05/08/2023       Lab Results   Component Value Date     05/08/2023    K 3.9 05/08/2023     05/08/2023    CO2 26 05/08/2023    GLU 53 (L) 05/08/2023    BUN 16 05/08/2023    CALCIUM 10.1 05/08/2023    PROT 8.0 05/08/2023    ALBUMIN 4.8 05/08/2023    BILITOT 0.5 05/08/2023    AST 15 05/08/2023    ALKPHOS 89 05/08/2023    ALT 10 05/08/2023       Lab Results   Component Value Date    COLORU Yellow 05/01/2023    APPEARANCEUA Clear 05/01/2023    SPECGRAV 1.025 05/01/2023    PHUR 7.0 05/01/2023    PROTEINUA Negative 05/01/2023    KETONESU Trace (A) 05/01/2023    LEUKOCYTESUR Negative 05/01/2023    NITRITE Negative 05/01/2023       Lab Results    Component Value Date    CRP 0.8 05/08/2023       Lab Results   Component Value Date    SEDRATE 3 05/08/2023       Lab Results   Component Value Date    DOROTHEA Positive (A) 10/20/2022    RF <13.0 05/08/2023    SEDRATE 3 05/08/2023       No components found for: 25OHVITDTOT, 49VBDQVQ9, 76HQWLTJ0, METHODNOTE    No results found for: URICACID    No components found for: TSPOTTB        Imaging:  All imaging reviewed and independently interpreted by me.         ASSESSMENT / PLAN:     Yosvany Craig is a 17 y.o. White male with:      1. Positive DOROTHEA (antinuclear antibody)  - DOROTHEA 1:320, Homogenous/Speckled, Profile Negative  - work up showed equivocal HCV, otherwise unremarkable   - repeat HCV at later date  - at this point unclear why he is having these flares, ?[place], ?Vasculitis, ?Environmental, ?Allergies, ?Stress  - I have discussed the need to monitor further and see if any other development of symptoms, we can consider CT in the future, Allergy testing  - advised to continue keeping a log  - Medrol pack PRN flare  - reassurance    2. Other specified counseling  - over 10 minutes spent regarding below topics:  - Immunization counseling done.  - Weight loss counseling done.  - Nutrition and exercise counseling.  - Limitation of alcohol consumption.  - Regular exercise:  Aerobic and resistance.  - Medication counseling provided.      No follow-ups on file.    Method of contact with patient concerns: [facility]    Disclaimer:  This note is prepared using voice recognition software and as such is likely to have errors and has not been proof read. Please contact me for questions.     Time spent: 30 minutes in face to face discussion concerning diagnosis, prognosis, review of lab and test results, benefits of treatment as well as management of disease, counseling of patient and coordination of care between various health care providers.  Greater than half the time spent was used for coordination of care and  counseling of patient.    Pablo Perdue M.D.  Rheumatology Department   [facility]

## 2023-09-27 ENCOUNTER — OFFICE VISIT (OUTPATIENT)
Dept: RHEUMATOLOGY | Facility: CLINIC | Age: 18
End: 2023-09-27
Payer: COMMERCIAL

## 2023-09-27 DIAGNOSIS — R76.8 POSITIVE ANA (ANTINUCLEAR ANTIBODY): Primary | ICD-10-CM

## 2023-09-27 DIAGNOSIS — Z71.89 COUNSELING AND COORDINATION OF CARE: ICD-10-CM

## 2023-09-27 PROCEDURE — 99214 OFFICE O/P EST MOD 30 MIN: CPT | Mod: 95,,, | Performed by: INTERNAL MEDICINE

## 2023-09-27 PROCEDURE — 99214 PR OFFICE/OUTPT VISIT, EST, LEVL IV, 30-39 MIN: ICD-10-PCS | Mod: 95,,, | Performed by: INTERNAL MEDICINE

## 2023-09-27 NOTE — PROGRESS NOTES
The patient location is: [place]  The chief complaint leading to consultation is: Follow up +DOROTHEA  Visit type: Virtual visit with synchronous audio and video  Total time spent with patient: 15 minutes     Each patient to whom he or she provides medical services by telemedicine is:  (1) informed of the relationship between the physician and patient and the respective role of any other health care provider with respect to management of the patient; and (2) notified that he or she may decline to receive medical services by telemedicine and may withdraw from such care at any time.           RHEUMATOLOGY OUTPATIENT CLINIC NOTE    9/27/2023    Attending Rheumatologist: Pablo Perdue  Primary Care Provider: Kizzy Whitehead MD   Physician Requesting Consultation: No referring provider defined for this encounter.  Chief Complaint/Reason For Consultation:  No chief complaint on file.      Subjective:       BOB Craig is a 18 y.o. White male with medical history noted below who presents for evaluation of +DOROTHEA.   Patient presents for evaluation of +DOROTHEA. He notes around 2019 going to see ENT for behind ear pain, fatigue. Had DOROTHEA checked then (+), did not see Rheum. He notes now similar presentation, pain behind the ear, warmth, redness, fatigue, brain fog. These are episodic, though he can note it occurring a couple times a week. Notes an episode in March that was associated with dry, irritated eyes, dizzy. Denies fever, chills, sore throat, runny nose. Has tried ABX, allergie meds, Medrol with little relief, notes a cold rag over the head helps. No hearing loss, +Ringing. FHX of RA. +Tremors, GERD.  No Alopecia, Oral/Nasal Ulcers, Rash, Photosensitivity, Dry Mouth, Pleuritis/serositis, Arthritis, Easy Bruising, LAD, Raynaud's, Blood clots, Skin tightening, SOB, Chest pain, wt loss, diarrhea or constipation, urinary issues.     Today  Patient here for follow up.   Last visit was going back home for summer and  monitor symptoms. He notes having wisdom tooth surgery in august, and noted it was impinging a nerve since then symptoms improving.     Review of Systems   Constitutional:  Negative for chills, fatigue, fever and unexpected weight change.   HENT:  Negative for ear pain, mouth sores and trouble swallowing.    Eyes:  Negative for redness and eye dryness.   Respiratory:  Negative for cough and shortness of breath.    Cardiovascular:  Negative for chest pain.   Gastrointestinal:  Negative for abdominal distention, constipation, diarrhea, nausea, vomiting and reflux.   Genitourinary:  Negative for genital sores.   Musculoskeletal:  Negative for arthralgias, back pain, gait problem, joint swelling, leg pain, myalgias, neck pain, neck stiffness and joint deformity.   Integumentary:  Negative for rash.   Neurological:  Negative for weakness, numbness, headaches and memory loss.   Hematological:  Negative for adenopathy. Does not bruise/bleed easily.   Psychiatric/Behavioral:  Negative for confusion, decreased concentration, sleep disturbance and suicidal ideas. The patient is not nervous/anxious.    All other systems reviewed and are negative.       Chronic comorbid conditions affecting medical decision making today:  No past medical history on file.  No past surgical history on file.  Family History   Problem Relation Age of Onset    Heart disease Father      Social History     Substance and Sexual Activity   Alcohol Use None     Social History     Tobacco Use   Smoking Status Never   Smokeless Tobacco Never     Social History     Substance and Sexual Activity   Drug Use Not on file       Current Outpatient Medications:     adapalene-benzoyl peroxide (EPIDUO FORTE) 0.3-2.5 % GlwP, Apply topically every evening., Disp: , Rfl:     amoxicillin-clavulanate 500-125mg (AUGMENTIN) 500-125 mg Tab, Take 1 tablet (500 mg total) by mouth 2 (two) times daily. (Patient not taking: Reported on 5/1/2023), Disp: 14 tablet, Rfl: 0     fluocinonide (LIDEX) 0.05 % ointment, Apply topically 2 (two) times daily as needed., Disp: , Rfl:     loratadine (CLARITIN) 10 mg tablet, Take 1 tablet (10 mg total) by mouth once daily. (Patient not taking: Reported on 5/1/2023), Disp: 30 tablet, Rfl: 0    methylPREDNISolone (MEDROL DOSEPACK) 4 mg tablet, use as directed, Disp: 1 each, Rfl: 0    olopatadine (PATADAY) 0.2 % Drop, Place 1 drop into both eyes once daily. (Patient not taking: Reported on 5/1/2023), Disp: 2.5 mL, Rfl: 0     Objective:         There were no vitals filed for this visit.    Physical Exam  Can make fist, no synovitis  Wrists, Elbows, Shoulder ROM ok  Hip, Knee Ok  Negative ankle/MTP  No tender points  Ears: +tympanic membrane, wax   TTP behind ears     9/27/23: Virtual Visit    Reviewed old and all outside pertinent medical records available.    All lab results personally reviewed and interpreted by me.  Lab Results   Component Value Date    WBC 6.98 05/08/2023    HGB 15.1 05/08/2023    HCT 47.4 05/08/2023    MCV 89 05/08/2023    MCH 28.3 05/08/2023    MCHC 31.9 (L) 05/08/2023    RDW 13.0 05/08/2023     05/08/2023    MPV 10.5 05/08/2023       Lab Results   Component Value Date     05/08/2023    K 3.9 05/08/2023     05/08/2023    CO2 26 05/08/2023    GLU 53 (L) 05/08/2023    BUN 16 05/08/2023    CALCIUM 10.1 05/08/2023    PROT 8.0 05/08/2023    ALBUMIN 4.8 05/08/2023    BILITOT 0.5 05/08/2023    AST 15 05/08/2023    ALKPHOS 89 05/08/2023    ALT 10 05/08/2023       Lab Results   Component Value Date    COLORU Yellow 05/01/2023    APPEARANCEUA Clear 05/01/2023    SPECGRAV 1.025 05/01/2023    PHUR 7.0 05/01/2023    PROTEINUA Negative 05/01/2023    KETONESU Trace (A) 05/01/2023    LEUKOCYTESUR Negative 05/01/2023    NITRITE Negative 05/01/2023       Lab Results   Component Value Date    CRP 0.8 05/08/2023       Lab Results   Component Value Date    SEDRATE 3 05/08/2023       Lab Results   Component Value Date    DOROTHEA Positive  "(A) 10/20/2022    RF <13.0 05/08/2023    SEDRATE 3 05/08/2023       No components found for: "25OHVITDTOT", "91IXJGAM8", "57BNLZGP6", "METHODNOTE"    No results found for: "URICACID"    No components found for: "TSPOTTB"        Imaging:  All imaging reviewed and independently interpreted by me.         ASSESSMENT / PLAN:     Yosvany Craig is a 18 y.o. White male with:      1. Positive DOROTHEA (antinuclear antibody)  - DOROTHEA 1:320, Homogenous/Speckled, Profile Negative  - symptoms improved  - repeat HCV  - reassurance    2. Other specified counseling  - over 10 minutes spent regarding below topics:  - Immunization counseling done.  - Weight loss counseling done.  - Nutrition and exercise counseling.  - Limitation of alcohol consumption.  - Regular exercise:  Aerobic and resistance.  - Medication counseling provided.      No follow-ups on file.    Method of contact with patient concerns: [facility]    Disclaimer:  This note is prepared using voice recognition software and as such is likely to have errors and has not been proof read. Please contact me for questions.     Time spent: 30 minutes in face to face discussion concerning diagnosis, prognosis, review of lab and test results, benefits of treatment as well as management of disease, counseling of patient and coordination of care between various health care providers.  Greater than half the time spent was used for coordination of care and counseling of patient.    Pablo Perdue M.D.  Rheumatology Department   [facility]     "